# Patient Record
Sex: MALE | Race: WHITE | NOT HISPANIC OR LATINO | URBAN - METROPOLITAN AREA
[De-identification: names, ages, dates, MRNs, and addresses within clinical notes are randomized per-mention and may not be internally consistent; named-entity substitution may affect disease eponyms.]

---

## 2022-08-05 ENCOUNTER — TELEPHONE ENCOUNTER (OUTPATIENT)
Dept: URBAN - METROPOLITAN AREA CLINIC 68 | Facility: CLINIC | Age: 67
End: 2022-08-05

## 2022-08-18 ENCOUNTER — DASHBOARD ENCOUNTERS (OUTPATIENT)
Age: 67
End: 2022-08-18

## 2022-08-18 ENCOUNTER — OFFICE VISIT (OUTPATIENT)
Dept: URBAN - METROPOLITAN AREA CLINIC 68 | Facility: CLINIC | Age: 67
End: 2022-08-18

## 2022-08-18 RX ORDER — SPIRONOLACTONE 50 MG/1
1 TABLET TABLET, FILM COATED ORAL ONCE A DAY
Status: ACTIVE | COMMUNITY

## 2022-08-18 RX ORDER — SPIRONOLACTONE 50 MG/1
1 TABLET TABLET, FILM COATED ORAL TWICE A DAY
Qty: 60 TABLET | OUTPATIENT

## 2022-08-18 RX ORDER — FUROSEMIDE 40 MG/1
1 TABLET TABLET ORAL ONCE A DAY
Status: ACTIVE | COMMUNITY

## 2022-08-18 RX ORDER — FUROSEMIDE 40 MG/1
TWICE TABLET ORAL
Qty: 60 | OUTPATIENT

## 2022-08-18 RX ORDER — PANTOPRAZOLE SODIUM 40 MG/1
1 TABLET TABLET, DELAYED RELEASE ORAL ONCE A DAY
Status: ACTIVE | COMMUNITY

## 2022-08-18 NOTE — EXAM-MIGRATED EXAMINATIONS
General Examination: General appearance: -> alert, pleasant, well-nourished and in no acute distress   Head: -> normocephalic, atraumatic   Eyes: -> pupils equal, round, reactive to light and accommodation   Ears: -> normal   Nose: -> no lesions   Oral cavity: -> normal , mucosa moist , tongue is midline , with good dentition   Chest: -> chest wall with no costochondral junction tenderness, no rib deformity and normal shape and expansion   Abdomen: -> moderately distended   Rectal: -> not examined   Musculoskeletal: -> no swelling, redness, warmth or tenderness of the shoulder(s) with full range of motion   Extremities: -> normal extremity with no clubbing, cyanosis or edema   Neurologic: -> nonfocal , alert and oriented   Throat: -> clear   Neck / thyroid: -> neck is supple, with full range of motion and no cervical lymphadenopathy , no masses and/or tenderness , no jugular venous distention , trachea midline   Lymph nodes: -> no axillary, supraclavicular or inguinal lymphadenopathy   Skin: -> skin is warm and dry, with no rashes, good skin turgor and normal hair distribution   Heart: -> regular rate and rhythm without murmurs, gallops, clicks or rubs   Lungs: -> clear to auscultation bilaterally, with good air movement and no rales, rhonchi or wheezes

## 2022-08-18 NOTE — HPI-MIGRATED HPI
General : 66-year-old with a complicated hepatic history.  He says he has a history of hepatocellular carcinoma treated with injection and radiation pellets in [place] no surgery was performed says he has a history of hepatitis C which was treated and resolved by another physician who is left the area   More recently routine labs revealed a hemoglobin of 6.5 and he was admitted for evaluation,  evaluation revealed large esophageal varices patient had endoscopic esophageal band ligation therapy on 722   7/22/2022 EGD.  Large esophageal varices.  Endoscopic banding performed x 4 7/22/2022 colonoscopy.  No lesions or masses     Impression 1.  Recent bleeding and dropped hemoglobin due to variceal bleeding 2.  Status post endoscopic band ligation of large varices appear stable no sign of any bleeding 3.  Stable hemodynamic condition 4.  History of cirrhosis and hepatoma  5.  Review of CT reveals a 2 cm gallstone and a biliary stent significance uncertain 6.  Hepatitis C status posttreatment unknown 7.  Obesity and abdominal distention ascites noted on ultrasound

## 2022-08-23 ENCOUNTER — OFFICE VISIT (OUTPATIENT)
Dept: URBAN - METROPOLITAN AREA CLINIC 68 | Facility: CLINIC | Age: 67
End: 2022-08-23

## 2022-08-23 LAB
AFP, SERUM, TUMOR MARKER: 23.1
ALBUMIN: 3.9
ALKALINE PHOSPHATASE: 789
ALT (SGPT): 44
AMBIG ABBREV BMP8 DEFAULT: (no result)
AMBIG ABBREV HFP7 DEFAULT: (no result)
APTT: 27
AST (SGOT): 89
BASO (ABSOLUTE): 0
BASOS: 0
BILIRUBIN, DIRECT: 0.37
BILIRUBIN, TOTAL: 0.9
BUN/CREATININE RATIO: 16
BUN: 20
CALCIUM: 9.8
CARBON DIOXIDE, TOTAL: 22
CHLORIDE: 95
CREATININE: 1.22
EGFR: 66
EOS (ABSOLUTE): 0.1
EOS: 2
GLUCOSE: 125
HEMATOCRIT: 35.7
HEMATOLOGY COMMENTS:: (no result)
HEMOGLOBIN: 11.7
IMMATURE CELLS: (no result)
IMMATURE GRANS (ABS): 0.1
IMMATURE GRANULOCYTES: 1
INR: 1.1
LYMPHS (ABSOLUTE): 0.8
LYMPHS: 9
MCH: 27.6
MCHC: 32.8
MCV: 84
MONOCYTES(ABSOLUTE): 0.7
MONOCYTES: 9
NEUTROPHILS (ABSOLUTE): 6.7
NEUTROPHILS: 79
NRBC: (no result)
PLATELETS: 350
POTASSIUM: 4.9
PROTEIN, TOTAL: 7.5
PROTHROMBIN TIME: 11.3
RBC: 4.24
RDW: 14.1
SODIUM: 132
WBC: 8.4

## 2022-10-27 ENCOUNTER — OFFICE VISIT (OUTPATIENT)
Dept: URBAN - METROPOLITAN AREA CLINIC 68 | Facility: CLINIC | Age: 67
End: 2022-10-27